# Patient Record
Sex: MALE | Race: WHITE | NOT HISPANIC OR LATINO | URBAN - METROPOLITAN AREA
[De-identification: names, ages, dates, MRNs, and addresses within clinical notes are randomized per-mention and may not be internally consistent; named-entity substitution may affect disease eponyms.]

---

## 2023-06-21 LAB
ALANINE AMINOTRANSFERASE (SGPT) (U/L) IN SER/PLAS: 14 U/L (ref 10–52)
ALBUMIN (G/DL) IN SER/PLAS: 4.8 G/DL (ref 3.4–5)
ALKALINE PHOSPHATASE (U/L) IN SER/PLAS: 94 U/L (ref 33–120)
ANION GAP IN SER/PLAS: 12 MMOL/L (ref 10–20)
ASPARTATE AMINOTRANSFERASE (SGOT) (U/L) IN SER/PLAS: 16 U/L (ref 9–39)
BASOPHILS (10*3/UL) IN BLOOD BY AUTOMATED COUNT: 0.05 X10E9/L (ref 0–0.1)
BASOPHILS/100 LEUKOCYTES IN BLOOD BY AUTOMATED COUNT: 0.8 % (ref 0–2)
BILIRUBIN TOTAL (MG/DL) IN SER/PLAS: 0.9 MG/DL (ref 0–1.2)
CALCIUM (MG/DL) IN SER/PLAS: 9.6 MG/DL (ref 8.6–10.6)
CARBON DIOXIDE, TOTAL (MMOL/L) IN SER/PLAS: 29 MMOL/L (ref 21–32)
CHLORIDE (MMOL/L) IN SER/PLAS: 104 MMOL/L (ref 98–107)
CREATININE (MG/DL) IN SER/PLAS: 0.92 MG/DL (ref 0.5–1.3)
EOSINOPHILS (10*3/UL) IN BLOOD BY AUTOMATED COUNT: 0.1 X10E9/L (ref 0–0.7)
EOSINOPHILS/100 LEUKOCYTES IN BLOOD BY AUTOMATED COUNT: 1.6 % (ref 0–6)
ERYTHROCYTE DISTRIBUTION WIDTH (RATIO) BY AUTOMATED COUNT: 12.5 % (ref 11.5–14.5)
ERYTHROCYTE MEAN CORPUSCULAR HEMOGLOBIN CONCENTRATION (G/DL) BY AUTOMATED: 33.9 G/DL (ref 32–36)
ERYTHROCYTE MEAN CORPUSCULAR VOLUME (FL) BY AUTOMATED COUNT: 91 FL (ref 80–100)
ERYTHROCYTES (10*6/UL) IN BLOOD BY AUTOMATED COUNT: 5.21 X10E12/L (ref 4.5–5.9)
GFR MALE: >90 ML/MIN/1.73M2
GLUCOSE (MG/DL) IN SER/PLAS: 82 MG/DL (ref 74–99)
HEMATOCRIT (%) IN BLOOD BY AUTOMATED COUNT: 47.2 % (ref 41–52)
HEMOGLOBIN (G/DL) IN BLOOD: 16 G/DL (ref 13.5–17.5)
HEPATITIS B VIRUS CORE AB (PRESENCE) IN SER/PLAS BY IMM: NONREACTIVE
HEPATITIS B VIRUS SURFACE AB (MIU/ML) IN SERUM: <3.1 MIU/ML
HEPATITIS B VIRUS SURFACE AG PRESENCE IN SERUM: NONREACTIVE
HEPATITIS C VIRUS AB PRESENCE IN SERUM: NONREACTIVE
IMMATURE GRANULOCYTES/100 LEUKOCYTES IN BLOOD BY AUTOMATED COUNT: 0.3 % (ref 0–0.9)
LEUKOCYTES (10*3/UL) IN BLOOD BY AUTOMATED COUNT: 6.1 X10E9/L (ref 4.4–11.3)
LYMPHOCYTES (10*3/UL) IN BLOOD BY AUTOMATED COUNT: 2.41 X10E9/L (ref 1.2–4.8)
LYMPHOCYTES/100 LEUKOCYTES IN BLOOD BY AUTOMATED COUNT: 39.4 % (ref 13–44)
MONOCYTES (10*3/UL) IN BLOOD BY AUTOMATED COUNT: 0.39 X10E9/L (ref 0.1–1)
MONOCYTES/100 LEUKOCYTES IN BLOOD BY AUTOMATED COUNT: 6.4 % (ref 2–10)
NEUTROPHILS (10*3/UL) IN BLOOD BY AUTOMATED COUNT: 3.15 X10E9/L (ref 1.2–7.7)
NEUTROPHILS/100 LEUKOCYTES IN BLOOD BY AUTOMATED COUNT: 51.5 % (ref 40–80)
NRBC (PER 100 WBCS) BY AUTOMATED COUNT: 0 /100 WBC (ref 0–0)
PLATELETS (10*3/UL) IN BLOOD AUTOMATED COUNT: 297 X10E9/L (ref 150–450)
POTASSIUM (MMOL/L) IN SER/PLAS: 3.8 MMOL/L (ref 3.5–5.3)
PROTEIN TOTAL: 7.3 G/DL (ref 6.4–8.2)
SODIUM (MMOL/L) IN SER/PLAS: 141 MMOL/L (ref 136–145)
UREA NITROGEN (MG/DL) IN SER/PLAS: 20 MG/DL (ref 6–23)

## 2023-06-23 LAB
NIL(NEG) CONTROL SPOT COUNT: NORMAL
PANEL A SPOT COUNT: 0
PANEL B SPOT COUNT: 0
POS CONTROL SPOT COUNT: NORMAL
T-SPOT. TB INTERPRETATION: NEGATIVE

## 2023-10-25 PROBLEM — F41.9 ANXIETY DISORDER: Status: ACTIVE | Noted: 2023-07-12

## 2023-10-25 PROBLEM — L40.0 PSORIASIS VULGARIS: Status: ACTIVE | Noted: 2023-07-12

## 2023-10-25 PROBLEM — R21 RASH AND OTHER NONSPECIFIC SKIN ERUPTION: Status: ACTIVE | Noted: 2023-07-12

## 2023-10-25 PROBLEM — L30.9 ECZEMA: Status: ACTIVE | Noted: 2023-10-25

## 2023-10-25 RX ORDER — TRIAMCINOLONE ACETONIDE 1 MG/G
CREAM TOPICAL
COMMUNITY
Start: 2020-12-29

## 2023-10-25 RX ORDER — SERTRALINE HYDROCHLORIDE 50 MG/1
1 TABLET, FILM COATED ORAL DAILY
COMMUNITY
Start: 2020-11-05

## 2023-10-25 RX ORDER — APREMILAST 30 MG/1
TABLET, FILM COATED ORAL
COMMUNITY
Start: 2021-07-12

## 2023-10-25 RX ORDER — APREMILAST 10-20-30MG
KIT ORAL
COMMUNITY
Start: 2022-08-29

## 2023-10-25 RX ORDER — FLUOCINONIDE 0.5 MG/G
CREAM TOPICAL
COMMUNITY
Start: 2020-12-29

## 2023-10-25 RX ORDER — IXEKIZUMAB 80 MG/ML
INJECTION, SOLUTION SUBCUTANEOUS
COMMUNITY
Start: 2023-06-26

## 2023-10-25 RX ORDER — ALPRAZOLAM 0.25 MG/1
1 TABLET ORAL DAILY PRN
COMMUNITY
Start: 2020-11-05

## 2023-10-27 ENCOUNTER — OFFICE VISIT (OUTPATIENT)
Dept: DERMATOLOGY | Facility: CLINIC | Age: 28
End: 2023-10-27
Payer: COMMERCIAL

## 2023-10-27 DIAGNOSIS — L40.9 PSORIASIS: ICD-10-CM

## 2023-10-27 DIAGNOSIS — B00.9 HERPESVIRAL INFECTION: Primary | ICD-10-CM

## 2023-10-27 PROCEDURE — 99214 OFFICE O/P EST MOD 30 MIN: CPT | Performed by: NURSE PRACTITIONER

## 2023-10-27 RX ORDER — VALACYCLOVIR HYDROCHLORIDE 1 G/1
TABLET, FILM COATED ORAL
Qty: 4 TABLET | Refills: 4 | Status: SHIPPED | OUTPATIENT
Start: 2023-10-27 | End: 2024-03-04 | Stop reason: SDUPTHER

## 2023-10-27 RX ORDER — RISANKIZUMAB-RZAA 150 MG/ML
INJECTION SUBCUTANEOUS
COMMUNITY
Start: 2023-08-15

## 2023-10-27 NOTE — PROGRESS NOTES
Subjective     Larry Rios is a 28 y.o. male who presents for the following: Psoriasis.     Review of Systems:  No other skin or systemic complaints other than what is documented elsewhere in the note.    The following portions of the chart were reviewed this encounter and updated as appropriate:          Skin Cancer History  No skin cancer on file.      Specialty Problems          Dermatology Problems    Psoriasis vulgaris    Rash and other nonspecific skin eruption    Eczema        Objective   Well appearing patient in no apparent distress; mood and affect are within normal limits.    A focused skin examination was performed. All findings within normal limits unless otherwise noted below.    Assessment/Plan

## 2023-10-27 NOTE — PROGRESS NOTES
Subjective     Larry Rios is a 28 y.o. male who presents for the following: Psoriasis.     Review of Systems:  No other skin or systemic complaints other than what is documented elsewhere in the note.    The following portions of the chart were reviewed this encounter and updated as appropriate:          Skin Cancer History  No skin cancer on file.      Specialty Problems          Dermatology Problems    Psoriasis vulgaris    Rash and other nonspecific skin eruption    Eczema        Objective   Well appearing patient in no apparent distress; mood and affect are within normal limits.    A focused skin examination was performed. All findings within normal limits unless otherwise noted below.    Assessment/Plan   1. Herpesviral infection  Right Lower Vermilion Lip  Irregularly shaped ulcer of vermillion border.    - Herpes simplex infection of the mouth and face, known as orofacial herpes simplex, herpes labialis, cold sores, or fever blisters, is a common, recurrent skin condition associated with infection by the herpes simplex virus (HSV). Orofacial HSV usually appears as small blisters or sores around the mouth, nose, genitals, and buttocks, though infections can develop almost anywhere on the skin. Furthermore, these cold sores may periodically come back in the same sites.  - Infections with HSV are very contagious and are spread by direct contact with the skin lesions. There are 2 types of HSV: herpes simplex virus type 1 (HSV-1) and herpes simplex virus type 2 (HSV-2). Infections around the mouth, lips, nose, or face are most commonly caused by HSV-1 but can be caused by HSV-2. Both HSV-2 and HSV-1 are common causes of infection of the genitals or buttocks.   - Both types of virus produce 2 kinds of infections: primary and recurrent. Because it so contagious, HSV causes a primary infection in most people who are exposed to the virus. However, only about 20% of people who are infected with HSV actually develop  visible blisters or sores. Appearing 2-12 days after a person's first exposure to HSV, the sores of a primary infection last about 1-3 weeks. These sores heal completely, rarely leaving a scar. Nevertheless, the virus remains in the body, hibernating in nerve cells.  - Certain triggers can cause the hibernating (latent) virus to wake up, become active, and travel back to the skin. These recurrent HSV infections may develop frequently (every few weeks), or they may never develop. Recurrent infections tend to be milder than primary infections and generally occur in the same location as the primary infection. Common triggers include; fever or illness, sun exposure, hormonal changes, such as those due to menstruation or pregnancy, stress, trauma, such as that caused by dental work or cuts from shaving, and surgery.   - Most people get cold sores as children, through contact with a friend or family member who is already infected with HSV. It can be spread (transmitted) by kissing, sharing eating utensils or drinking vessels, or by using the same towel.    Plan  - Start Valtrex, take as directed.   - Risks, benefits, side effects, alternatives and options were discussed with patient and the patient voiced understanding.      2. Psoriasis  Doing well on Skyrizi, PASI 95.     PSORIASIS        Psoriasis is a lifelong skin disorder that can cause areas of thickened, scaly and red skin that can be very itchy. Psoriasis can affect your joints and has been linked to other conditions like diabetes, heart disease, depression, high cholesterol and others.        Psoriasis is usually diagnosed by a skin exam but your provider may do a biopsy (sample of tissue). It is not an infection or spread from person to person. You cannot predict when psoriasis may improve or flare. It is important to share with your provider how this condition affects your body and quality of life.     TREATMENT  Psoriasis is not curable, but treatments may  reduce the symptoms and appearance of the disease.  Your provider may recommend one or more therapies. It is very important that you carefully follow the providers instructions for use.     CAUSES  - There is no known cause of psoriasis.  - It may be caused by a combination of immune, genetic, and environmental factors.   - Medications, physical or emotional stress and infections may cause flares.   - Smoking can worsen psoriasis, especially the palms and soles.     RESOURCES  The National Psoriasis Foundation (www.psoriasis.org) and the American Academy of Dermatology (https://www.aad.org/public/diseases/scaly-skin/psoriasis) are available to provide psoriasis information and support.    Plan   - Continue Skyrizi as directed.  - Reviewed outside provider notes for health specialty or primary care changes and updated chart and plan based on findings, if any.    - Counseled patient regarding the chronic nature of this condition. Discussed current and/or newly prescribed medications as well as reasons to call office prior to next visit (new plaques or flares, new joint pain, etc).   - Addressed concerns and questions regarding the treatment and plan with patient in office today.   - Risks, benefits, side effects, alternatives and options were discussed with patient and the patient voiced understanding.

## 2024-03-01 ENCOUNTER — TELEPHONE (OUTPATIENT)
Dept: DERMATOLOGY | Facility: CLINIC | Age: 29
End: 2024-03-01
Payer: COMMERCIAL

## 2024-03-01 NOTE — TELEPHONE ENCOUNTER
Pt called office at this time and states he needs a refill of his Valtrex medication.  Pt was last seen in October of 2023 and was given a prescription for valtrex 1 gram tablet to take 2 grams twice daily at onset of symptoms. This was ordered with 4 refills.     Please review and refill if appropriate.    Thank you!    Yousif Echeverria LPN

## 2024-03-04 DIAGNOSIS — B00.9 HERPESVIRAL INFECTION: ICD-10-CM

## 2024-03-04 RX ORDER — VALACYCLOVIR HYDROCHLORIDE 1 G/1
TABLET, FILM COATED ORAL
Qty: 4 TABLET | Refills: 4 | Status: SHIPPED | OUTPATIENT
Start: 2024-03-04

## 2024-05-02 ENCOUNTER — APPOINTMENT (OUTPATIENT)
Dept: DERMATOLOGY | Facility: CLINIC | Age: 29
End: 2024-05-02
Payer: COMMERCIAL

## 2024-05-10 ENCOUNTER — APPOINTMENT (OUTPATIENT)
Dept: DERMATOLOGY | Facility: CLINIC | Age: 29
End: 2024-05-10
Payer: COMMERCIAL

## 2024-08-12 RX ORDER — RISANKIZUMAB-RZAA 150 MG/ML
INJECTION SUBCUTANEOUS
Refills: 3 | OUTPATIENT
Start: 2024-08-12

## 2024-09-03 ENCOUNTER — TELEPHONE (OUTPATIENT)
Dept: DERMATOLOGY | Facility: CLINIC | Age: 29
End: 2024-09-03
Payer: COMMERCIAL

## 2024-09-04 RX ORDER — RISANKIZUMAB-RZAA 150 MG/ML
INJECTION SUBCUTANEOUS
Refills: 3 | OUTPATIENT
Start: 2024-09-04

## 2024-09-11 ENCOUNTER — APPOINTMENT (OUTPATIENT)
Dept: DERMATOLOGY | Facility: CLINIC | Age: 29
End: 2024-09-11
Payer: COMMERCIAL

## 2024-09-11 ENCOUNTER — LAB (OUTPATIENT)
Dept: LAB | Facility: LAB | Age: 29
End: 2024-09-11
Payer: COMMERCIAL

## 2024-09-11 DIAGNOSIS — L40.9 PSORIASIS: ICD-10-CM

## 2024-09-11 DIAGNOSIS — L40.9 PSORIASIS: Primary | ICD-10-CM

## 2024-09-11 DIAGNOSIS — B00.9 HERPESVIRAL INFECTION: ICD-10-CM

## 2024-09-11 PROCEDURE — 99214 OFFICE O/P EST MOD 30 MIN: CPT | Performed by: NURSE PRACTITIONER

## 2024-09-11 PROCEDURE — 36415 COLL VENOUS BLD VENIPUNCTURE: CPT

## 2024-09-11 PROCEDURE — 86481 TB AG RESPONSE T-CELL SUSP: CPT

## 2024-09-11 RX ORDER — VALACYCLOVIR HYDROCHLORIDE 1 G/1
TABLET, FILM COATED ORAL
Qty: 4 TABLET | Refills: 4 | Status: SHIPPED | OUTPATIENT
Start: 2024-09-11

## 2024-09-11 RX ORDER — RISANKIZUMAB-RZAA 150 MG/ML
150 INJECTION SUBCUTANEOUS SEE ADMIN INSTRUCTIONS
Qty: 1 ML | Refills: 3 | Status: SHIPPED | OUTPATIENT
Start: 2024-09-11 | End: 2024-09-13

## 2024-09-11 NOTE — PROGRESS NOTES
Subjective     Larry Rios is a 29 y.o. male who presents for the following: Psoriasis (Pt presents for 1 year follow up of psoriasis. Pt doing well on Skyrizi. States he is clear today. ).     Review of Systems:  No other skin or systemic complaints other than what is documented elsewhere in the note.    The following portions of the chart were reviewed this encounter and updated as appropriate:   Tobacco  Allergies  Meds  Problems  Med Hx  Surg Hx  Fam Hx         Skin Cancer History  No skin cancer on file.      Specialty Problems          Dermatology Problems    Psoriasis vulgaris    Rash and other nonspecific skin eruption    Eczema        Objective   Well appearing patient in no apparent distress; mood and affect are within normal limits.    A focused skin examination was performed. All findings within normal limits unless otherwise noted below.    Assessment/Plan   1. Psoriasis  Doing well on Skyrizi, PASI 100.     PSORIASIS        Psoriasis is a lifelong skin disorder that can cause areas of thickened, scaly and red skin that can be very itchy. Psoriasis can affect your joints and has been linked to other conditions like diabetes, heart disease, depression, high cholesterol and others.        Psoriasis is usually diagnosed by a skin exam but your provider may do a biopsy (sample of tissue). It is not an infection or spread from person to person. You cannot predict when psoriasis may improve or flare. It is important to share with your provider how this condition affects your body and quality of life.     TREATMENT  Psoriasis is not curable, but treatments may reduce the symptoms and appearance of the disease.  Your provider may recommend one or more therapies. It is very important that you carefully follow the providers instructions for use.     CAUSES  - There is no known cause of psoriasis.  - It may be caused by a combination of immune, genetic, and environmental factors.   - Medications,  physical or emotional stress and infections may cause flares.   - Smoking can worsen psoriasis, especially the palms and soles.     RESOURCES  The National Psoriasis Foundation (www.psoriasis.org) and the American Academy of Dermatology (https://www.aad.org/public/diseases/scaly-skin/psoriasis) are available to provide psoriasis information and support.    Plan   - Continue Skyrizi as directed.  - Reviewed outside provider notes for health specialty or primary care changes and updated chart and plan based on findings, if any.    - Counseled patient regarding the chronic nature of this condition. Discussed current and/or newly prescribed medications as well as reasons to call office prior to next visit (new plaques or flares, new joint pain, etc).   - Tspot ordered, will go today for this lab.   - Addressed concerns and questions regarding the treatment and plan with patient in office today.   - Risks, benefits, side effects, alternatives and options were discussed with patient and the patient voiced understanding.      Related Procedures  T-Spot TB    2. Herpesviral infection  Right Lower Vermilion Lip  With flares, vesicles on an erythematous base. Clear today.     - Herpes simplex infection of the mouth and face, known as orofacial herpes simplex, herpes labialis, cold sores, or fever blisters, is a common, recurrent skin condition associated with infection by the herpes simplex virus (HSV). Orofacial HSV usually appears as small blisters or sores around the mouth, nose, genitals, and buttocks, though infections can develop almost anywhere on the skin. Furthermore, these cold sores may periodically come back in the same sites.  - Infections with HSV are very contagious and are spread by direct contact with the skin lesions. There are 2 types of HSV: herpes simplex virus type 1 (HSV-1) and herpes simplex virus type 2 (HSV-2). Infections around the mouth, lips, nose, or face are most commonly caused by HSV-1 but can  be caused by HSV-2. Both HSV-2 and HSV-1 are common causes of infection of the genitals or buttocks.   - Both types of virus produce 2 kinds of infections: primary and recurrent. Because it so contagious, HSV causes a primary infection in most people who are exposed to the virus. However, only about 20% of people who are infected with HSV actually develop visible blisters or sores. Appearing 2-12 days after a person's first exposure to HSV, the sores of a primary infection last about 1-3 weeks. These sores heal completely, rarely leaving a scar. Nevertheless, the virus remains in the body, hibernating in nerve cells.  - Certain triggers can cause the hibernating (latent) virus to wake up, become active, and travel back to the skin. These recurrent HSV infections may develop frequently (every few weeks), or they may never develop. Recurrent infections tend to be milder than primary infections and generally occur in the same location as the primary infection. Common triggers include; fever or illness, sun exposure, hormonal changes, such as those due to menstruation or pregnancy, stress, trauma, such as that caused by dental work or cuts from shaving, and surgery.   - Most people get cold sores as children, through contact with a friend or family member who is already infected with HSV. It can be spread (transmitted) by kissing, sharing eating utensils or drinking vessels, or by using the same towel.    Plan  - Start Valtrex, take as directed.   - Risks, benefits, side effects, alternatives and options were discussed with patient and the patient voiced understanding.      Related Medications  valACYclovir (Valtrex) 1 gram tablet  2 g twice daily for 1 day at onset of symptoms      Follow up in 12 months. Please call me if there are any changes or development of concerning symptoms (lesion/skin condition is changing, bleeding, enlarging, or worsening).

## 2024-09-12 ENCOUNTER — SPECIALTY PHARMACY (OUTPATIENT)
Dept: PHARMACY | Facility: CLINIC | Age: 29
End: 2024-09-12

## 2024-09-13 DIAGNOSIS — L40.9 PSORIASIS: ICD-10-CM

## 2024-09-13 RX ORDER — RISANKIZUMAB-RZAA 150 MG/ML
150 INJECTION SUBCUTANEOUS
Qty: 1 ML | Refills: 3 | Status: SHIPPED | OUTPATIENT
Start: 2024-09-13

## 2024-09-13 NOTE — PROGRESS NOTES
Skyrizi refills sent to  Specialty. Prior authorization needed, approved until 9/13/2025.  Specialty unable to dispense, must fill with [facility].     Clinical pharmacist routed Skyrizi refills to [facility] per insurance mandate. Updated Tspot negative.

## 2024-12-06 ENCOUNTER — ANCILLARY PROCEDURE (OUTPATIENT)
Dept: URGENT CARE | Age: 29
End: 2024-12-06
Payer: COMMERCIAL

## 2024-12-06 ENCOUNTER — OFFICE VISIT (OUTPATIENT)
Dept: URGENT CARE | Age: 29
End: 2024-12-06
Payer: COMMERCIAL

## 2024-12-06 DIAGNOSIS — S59.911A INJURY OF RIGHT FOREARM, INITIAL ENCOUNTER: ICD-10-CM

## 2024-12-06 DIAGNOSIS — S59.911A INJURY OF RIGHT FOREARM, INITIAL ENCOUNTER: Primary | ICD-10-CM

## 2024-12-06 PROCEDURE — 73090 X-RAY EXAM OF FOREARM: CPT | Mod: RIGHT SIDE

## 2024-12-06 ASSESSMENT — ENCOUNTER SYMPTOMS
ARTHRALGIAS: 1
CONSTITUTIONAL NEGATIVE: 1

## 2024-12-07 NOTE — PROGRESS NOTES
Subjective   Patient ID: Larry Rios is a 29 y.o. male. They present today with a chief complaint of No chief complaint on file..    History of Present Illness  29-year-old male presents to clinic today with complaints of a right forearm injury.  Patient states that a metal pole landed on his right forearm earlier today.  He states that since then he has felt pain in that area and sometimes will radiate into the wrist.  He has noted it worse with wrist movement as well as finger movement.  Denies any numbness or tingling.          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 No pertinent surgical history.     reports that he has quit smoking. His smoking use included cigarettes. He does not have any smokeless tobacco history on file.    Review of Systems  Review of Systems   Constitutional: Negative.    Musculoskeletal:  Positive for arthralgias.                                  Objective    There were no vitals filed for this visit.  No LMP for male patient.    Physical Exam  Constitutional:       Appearance: Normal appearance.   Musculoskeletal:      Comments: Tenderness to dorsal aspect of proximal forearm, no deformity.  Aggravation with wrist flexion and extension.  Wrist flexion extension 5 out of 5.   strength WNL bilateral   Neurological:      Mental Status: He is alert.         Procedures    Point of Care Test & Imaging Results from this visit  No results found for this visit on 12/06/24.   XR forearm right 2 views    Result Date: 12/6/2024  Interpreted By:  Gavin Ruano, STUDY: XR FOREARM RIGHT 2 VIEWS; ;  12/6/2024 5:30 pm   INDICATION: Signs/Symptoms:trauma.   ,S59.911A Unspecified injury of right forearm, initial encounter    COMPARISON: None.   ACCESSION NUMBER(S): [id number]   ORDERING CLINICIAN: ROBERTO TAYLOR   FINDINGS: Three views demonstrate no gross displaced fracture or dislocation.       No gross displaced fracture or dislocation.     MACRO: None   Signed by: Gavin Ruano 12/6/2024 5:45 PM Dictation workstation:   [id number]     Diagnostic study results (if any) were reviewed by Roberto Taylor PA-C.    Assessment/Plan   Allergies, medications, history, and pertinent labs/EKGs/Imaging reviewed by Roberto Taylor PA-C.     Medical Decision Making  Patient pleasant and cooperative on examination.    There is noted tenderness to the proximal aspect of the forearm.  With the trauma will x-rays were obtained.  They were negative for any acute bony pathology.    Discussed with patient is most likely soft tissue injury of the forearm.    Advised him to ice, elevate as well as using Tylenol and ibuprofen for symptomatic relief.    Orders and Diagnoses  Diagnoses and all orders for this visit:  Injury of right forearm, initial encounter  -     XR forearm right 2 views; Future      Medical Admin Record      Patient disposition: Home    Electronically signed by Roberto Taylor PA-C  8:07 PM

## 2025-01-03 ENCOUNTER — APPOINTMENT (OUTPATIENT)
Dept: DERMATOLOGY | Facility: CLINIC | Age: 30
End: 2025-01-03
Payer: COMMERCIAL

## 2025-03-20 ENCOUNTER — APPOINTMENT (OUTPATIENT)
Dept: PRIMARY CARE | Facility: CLINIC | Age: 30
End: 2025-03-20
Payer: COMMERCIAL

## 2025-05-09 ENCOUNTER — APPOINTMENT (OUTPATIENT)
Dept: PRIMARY CARE | Facility: CLINIC | Age: 30
End: 2025-05-09
Payer: COMMERCIAL

## 2025-05-09 VITALS
WEIGHT: 171.6 LBS | SYSTOLIC BLOOD PRESSURE: 126 MMHG | OXYGEN SATURATION: 97 % | DIASTOLIC BLOOD PRESSURE: 76 MMHG | HEIGHT: 70 IN | BODY MASS INDEX: 24.57 KG/M2 | HEART RATE: 73 BPM | TEMPERATURE: 97.9 F

## 2025-05-09 DIAGNOSIS — R07.89 CHEST PRESSURE: ICD-10-CM

## 2025-05-09 DIAGNOSIS — Z13.220 SCREENING, LIPID: Primary | ICD-10-CM

## 2025-05-09 DIAGNOSIS — Z13.1 SCREENING FOR DIABETES MELLITUS: ICD-10-CM

## 2025-05-09 PROCEDURE — 93000 ELECTROCARDIOGRAM COMPLETE: CPT | Performed by: STUDENT IN AN ORGANIZED HEALTH CARE EDUCATION/TRAINING PROGRAM

## 2025-05-09 PROCEDURE — 3008F BODY MASS INDEX DOCD: CPT | Performed by: STUDENT IN AN ORGANIZED HEALTH CARE EDUCATION/TRAINING PROGRAM

## 2025-05-09 PROCEDURE — 99204 OFFICE O/P NEW MOD 45 MIN: CPT | Performed by: STUDENT IN AN ORGANIZED HEALTH CARE EDUCATION/TRAINING PROGRAM

## 2025-05-09 RX ORDER — OMEPRAZOLE 20 MG/1
20 TABLET, DELAYED RELEASE ORAL DAILY
Qty: 30 TABLET | Refills: 11 | COMMUNITY
Start: 2025-05-09 | End: 2026-05-09

## 2025-05-09 ASSESSMENT — ENCOUNTER SYMPTOMS
SHORTNESS OF BREATH: 0
NERVOUS/ANXIOUS: 0
DYSPHORIC MOOD: 0
PALPITATIONS: 1
FATIGUE: 1
COUGH: 0

## 2025-05-09 ASSESSMENT — PATIENT HEALTH QUESTIONNAIRE - PHQ9
2. FEELING DOWN, DEPRESSED OR HOPELESS: NOT AT ALL
SUM OF ALL RESPONSES TO PHQ9 QUESTIONS 1 & 2: 0
1. LITTLE INTEREST OR PLEASURE IN DOING THINGS: NOT AT ALL

## 2025-05-09 NOTE — PROGRESS NOTES
Assessment/Plan   Assessment & Plan  Screening, lipid    Orders:    Lipid Panel; Future    Screening for diabetes mellitus    Orders:    Hemoglobin A1C; Future    Chest pressure    Orders:    CBC; Future    Comprehensive Metabolic Panel; Future    ECG 12 lead (Clinic Performed)    omeprazole OTC (PriLOSEC OTC) 20 mg EC tablet; Take 1 tablet (20 mg) by mouth once daily. Do not crush, chew, or split.  normal EKG  Trial prilosec one month      Subjective   Patient ID: Larry Rios is a 29 y.o. male who presents for Transfer Of Care (From [facility]) and Establish Care (Needs to reestablish, has been having chest pain, tense feeling, like he has to stretch but no relief. Sometime sharp pain, but mainly pressure, localized to left in line with nipple but close to midline. Not getting worse but not getting better and is consistent ).  HPI      Chest pain  -He has had CP for a couple of months ago. CP is persistent. At night CP is worse.  -Not worse with exertion.   Patient has sharp chest pain on L side of chest that will sometimes feel like a pressure as well. Symptoms are constant.   -Patient does construction for a living.   -Patient does eat close to bedtime. He does eat hot sauce on most of his foods.   -He does drink energy drinks regularly. Each energy drink has 200 mg and sometimes he will be having these energy   Review of Systems   Constitutional:  Positive for fatigue.   Respiratory:  Negative for cough and shortness of breath.    Cardiovascular:  Positive for chest pain and palpitations (when chest painbecomes more severe will feel heart race). Negative for leg swelling.   Psychiatric/Behavioral:  Negative for dysphoric mood. The patient is not nervous/anxious.        Objective   Physical Exam  Constitutional:       General: He is not in acute distress.     Appearance: Normal appearance. He is not ill-appearing.   HENT:      Head: Normocephalic and atraumatic.   Eyes:      Extraocular Movements:  Extraocular movements intact.   Cardiovascular:      Rate and Rhythm: Normal rate and regular rhythm.   Pulmonary:      Effort: Pulmonary effort is normal. No respiratory distress.      Breath sounds: Normal breath sounds. No stridor. No wheezing, rhonchi or rales.   Neurological:      Mental Status: He is alert.               Jigar Pascal MD 05/09/25 4:23 PM

## 2025-06-10 ENCOUNTER — APPOINTMENT (OUTPATIENT)
Dept: PRIMARY CARE | Facility: CLINIC | Age: 30
End: 2025-06-10
Payer: COMMERCIAL

## 2025-06-18 ENCOUNTER — APPOINTMENT (OUTPATIENT)
Dept: PRIMARY CARE | Facility: CLINIC | Age: 30
End: 2025-06-18
Payer: COMMERCIAL

## 2025-09-03 DIAGNOSIS — L40.9 PSORIASIS: ICD-10-CM

## 2025-09-03 RX ORDER — RISANKIZUMAB-RZAA 150 MG/ML
INJECTION SUBCUTANEOUS
Refills: 3 | OUTPATIENT
Start: 2025-09-03